# Patient Record
Sex: FEMALE | Race: BLACK OR AFRICAN AMERICAN | NOT HISPANIC OR LATINO | URBAN - METROPOLITAN AREA
[De-identification: names, ages, dates, MRNs, and addresses within clinical notes are randomized per-mention and may not be internally consistent; named-entity substitution may affect disease eponyms.]

---

## 2021-05-23 ENCOUNTER — APPOINTMENT (OUTPATIENT)
Dept: URGENT CARE | Age: 21
End: 2021-05-23

## 2021-05-23 ENCOUNTER — TELEPHONE (OUTPATIENT)
Dept: SCHEDULING | Age: 21
End: 2021-05-23

## 2024-05-03 ENCOUNTER — APPOINTMENT (OUTPATIENT)
Dept: ULTRASOUND IMAGING | Facility: HOSPITAL | Age: 24
End: 2024-05-03
Attending: EMERGENCY MEDICINE
Payer: MEDICAID

## 2024-05-03 ENCOUNTER — HOSPITAL ENCOUNTER (EMERGENCY)
Facility: HOSPITAL | Age: 24
Discharge: HOME | End: 2024-05-04
Attending: EMERGENCY MEDICINE
Payer: MEDICAID
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
## 2024-05-03 DIAGNOSIS — O20.8 SUBCHORIONIC HEMORRHAGE OF PLACENTA IN FIRST TRIMESTER (HCC): Primary | ICD-10-CM

## 2024-05-03 DIAGNOSIS — S61.519A SELF-CUTTING OF WRIST (HCC): ICD-10-CM

## 2024-05-03 LAB
ANION GAP SERPL CALC-SCNC: 10 MMOL/L (ref 0–18)
B-HCG UR QL: POSITIVE
BILIRUB UR QL: NEGATIVE
BUN BLD-MCNC: 8 MG/DL (ref 9–23)
BUN/CREAT SERPL: 9.6 (ref 10–20)
CALCIUM BLD-MCNC: 10.2 MG/DL (ref 8.7–10.4)
CHLORIDE SERPL-SCNC: 108 MMOL/L (ref 98–112)
CO2 SERPL-SCNC: 19 MMOL/L (ref 21–32)
COLOR UR: YELLOW
CREAT BLD-MCNC: 0.83 MG/DL
EGFRCR SERPLBLD CKD-EPI 2021: 103 ML/MIN/1.73M2 (ref 60–?)
GLUCOSE BLD-MCNC: 92 MG/DL (ref 70–99)
GLUCOSE UR-MCNC: NORMAL MG/DL
HGB UR QL STRIP.AUTO: NEGATIVE
HYALINE CASTS #/AREA URNS AUTO: PRESENT /LPF
LEUKOCYTE ESTERASE UR QL STRIP.AUTO: 500
NITRITE UR QL STRIP.AUTO: NEGATIVE
OSMOLALITY SERPL CALC.SUM OF ELEC: 282 MOSM/KG (ref 275–295)
PH UR: 6 [PH] (ref 5–8)
POTASSIUM SERPL-SCNC: 3.6 MMOL/L (ref 3.5–5.1)
PROT UR-MCNC: 300 MG/DL
RBC #/AREA URNS AUTO: >10 /HPF
SARS-COV-2 RNA RESP QL NAA+PROBE: NOT DETECTED
SODIUM SERPL-SCNC: 137 MMOL/L (ref 136–145)
SP GR UR STRIP: >1.03 (ref 1–1.03)
UROBILINOGEN UR STRIP-ACNC: 2
WBC #/AREA URNS AUTO: >50 /HPF

## 2024-05-03 PROCEDURE — 99285 EMERGENCY DEPT VISIT HI MDM: CPT

## 2024-05-03 PROCEDURE — 80048 BASIC METABOLIC PNL TOTAL CA: CPT | Performed by: EMERGENCY MEDICINE

## 2024-05-03 PROCEDURE — 85025 COMPLETE CBC W/AUTO DIFF WBC: CPT | Performed by: EMERGENCY MEDICINE

## 2024-05-03 PROCEDURE — 87086 URINE CULTURE/COLONY COUNT: CPT | Performed by: EMERGENCY MEDICINE

## 2024-05-03 PROCEDURE — 85060 BLOOD SMEAR INTERPRETATION: CPT | Performed by: EMERGENCY MEDICINE

## 2024-05-03 PROCEDURE — 87077 CULTURE AEROBIC IDENTIFY: CPT | Performed by: EMERGENCY MEDICINE

## 2024-05-03 PROCEDURE — 76817 TRANSVAGINAL US OBSTETRIC: CPT | Performed by: EMERGENCY MEDICINE

## 2024-05-03 PROCEDURE — 36415 COLL VENOUS BLD VENIPUNCTURE: CPT

## 2024-05-03 PROCEDURE — 81001 URINALYSIS AUTO W/SCOPE: CPT | Performed by: EMERGENCY MEDICINE

## 2024-05-03 PROCEDURE — 81025 URINE PREGNANCY TEST: CPT

## 2024-05-03 PROCEDURE — 76801 OB US < 14 WKS SINGLE FETUS: CPT | Performed by: EMERGENCY MEDICINE

## 2024-05-04 VITALS
TEMPERATURE: 98 F | OXYGEN SATURATION: 99 % | SYSTOLIC BLOOD PRESSURE: 130 MMHG | BODY MASS INDEX: 42.68 KG/M2 | HEIGHT: 64 IN | DIASTOLIC BLOOD PRESSURE: 73 MMHG | HEART RATE: 96 BPM | WEIGHT: 250 LBS | RESPIRATION RATE: 18 BRPM

## 2024-05-04 LAB
BASOPHILS # BLD AUTO: 0.04 X10(3) UL (ref 0–0.2)
BASOPHILS NFR BLD AUTO: 0.3 %
DEPRECATED RDW RBC AUTO: 51.3 FL (ref 35.1–46.3)
EOSINOPHIL # BLD AUTO: 0.07 X10(3) UL (ref 0–0.7)
EOSINOPHIL NFR BLD AUTO: 0.5 %
ERYTHROCYTE [DISTWIDTH] IN BLOOD BY AUTOMATED COUNT: 22.6 % (ref 11–15)
HCT VFR BLD AUTO: 32.6 %
HGB BLD-MCNC: 9.7 G/DL
IMM GRANULOCYTES # BLD AUTO: 0.06 X10(3) UL (ref 0–1)
IMM GRANULOCYTES NFR BLD: 0.4 %
LYMPHOCYTES # BLD AUTO: 2.17 X10(3) UL (ref 1–4)
LYMPHOCYTES NFR BLD AUTO: 15 %
MCH RBC QN AUTO: 19.5 PG (ref 26–34)
MCHC RBC AUTO-ENTMCNC: 29.8 G/DL (ref 31–37)
MCV RBC AUTO: 65.5 FL
MONOCYTES # BLD AUTO: 1.19 X10(3) UL (ref 0.1–1)
MONOCYTES NFR BLD AUTO: 8.2 %
NEUTROPHILS # BLD AUTO: 10.94 X10 (3) UL (ref 1.5–7.7)
NEUTROPHILS # BLD AUTO: 10.94 X10(3) UL (ref 1.5–7.7)
NEUTROPHILS NFR BLD AUTO: 75.6 %
PLATELET # BLD AUTO: 393 10(3)UL (ref 150–450)
PLATELET MORPHOLOGY: NORMAL
RBC # BLD AUTO: 4.98 X10(6)UL
WBC # BLD AUTO: 14.5 X10(3) UL (ref 4–11)

## 2024-05-04 RX ORDER — CEPHALEXIN 500 MG/1
500 CAPSULE ORAL 3 TIMES DAILY
Qty: 15 CAPSULE | Refills: 0 | Status: SHIPPED | OUTPATIENT
Start: 2024-05-04 | End: 2024-05-09
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
## 2024-05-04 NOTE — ED QUICK NOTES
Per psych lesion, call [facility] once all lab and imaging results have resulted in the patients chart.
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
## 2024-05-04 NOTE — CM/SW NOTE
Received a call from the er triage requesting for a ride home for the pt. [facility] requested er triage to let pt know that this will be a one time courtesy lyft ride being arranged. Ride details given to er triage.
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
## 2024-05-04 NOTE — ED QUICK NOTES
Patient under close observation with every 15 minute documentation per paper record. Belongings removed, patient wearing hospital safety gown.

## 2024-05-04 NOTE — PROGRESS NOTES
ED Culture Callback Results Review    Pharmacist reviewed culture results from ED visit .    Final urine culture positive for untreated group B strep.    A new prescription for keflex was electronically sent to [facility] as discussed with Dr. Valera. The patient was contacted by phone, informed of the results, and educated regarding the new therapy. The patient verbalized understanding of the treatment plan and all questions were answered.    Derrick Ron PharmD   Emergency Medicine Pharmacist Specialist  05/04/24; 3:02 PM  
regular rate and rhythm
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
## 2024-05-05 RX ORDER — CEPHALEXIN 500 MG/1
500 CAPSULE ORAL 3 TIMES DAILY
Qty: 21 CAPSULE | Refills: 0 | Status: SHIPPED | OUTPATIENT
Start: 2024-05-05 | End: 2024-05-12